# Patient Record
Sex: FEMALE | Race: WHITE | HISPANIC OR LATINO | URBAN - METROPOLITAN AREA
[De-identification: names, ages, dates, MRNs, and addresses within clinical notes are randomized per-mention and may not be internally consistent; named-entity substitution may affect disease eponyms.]

---

## 2022-07-09 ENCOUNTER — TELEPHONE ENCOUNTER (OUTPATIENT)
Dept: URBAN - METROPOLITAN AREA CLINIC 121 | Facility: CLINIC | Age: 82
End: 2022-07-09

## 2022-07-10 ENCOUNTER — TELEPHONE ENCOUNTER (OUTPATIENT)
Dept: URBAN - METROPOLITAN AREA CLINIC 121 | Facility: CLINIC | Age: 82
End: 2022-07-10

## 2022-07-13 ENCOUNTER — OFFICE VISIT (OUTPATIENT)
Dept: URBAN - METROPOLITAN AREA CLINIC 63 | Facility: CLINIC | Age: 82
End: 2022-07-13

## 2022-08-12 ENCOUNTER — WEB ENCOUNTER (OUTPATIENT)
Dept: URBAN - METROPOLITAN AREA CLINIC 60 | Facility: CLINIC | Age: 82
End: 2022-08-12

## 2022-08-12 ENCOUNTER — OFFICE VISIT (OUTPATIENT)
Dept: URBAN - METROPOLITAN AREA CLINIC 60 | Facility: CLINIC | Age: 82
End: 2022-08-12
Payer: COMMERCIAL

## 2022-08-12 VITALS
SYSTOLIC BLOOD PRESSURE: 120 MMHG | WEIGHT: 150 LBS | BODY MASS INDEX: 24.99 KG/M2 | RESPIRATION RATE: 20 BRPM | DIASTOLIC BLOOD PRESSURE: 80 MMHG | OXYGEN SATURATION: 95 % | HEIGHT: 65 IN | HEART RATE: 81 BPM | TEMPERATURE: 97.7 F

## 2022-08-12 DIAGNOSIS — R19.7 DIARRHEA, UNSPECIFIED TYPE: ICD-10-CM

## 2022-08-12 DIAGNOSIS — R10.32 LEFT LOWER QUADRANT ABDOMINAL PAIN: ICD-10-CM

## 2022-08-12 PROCEDURE — 99204 OFFICE O/P NEW MOD 45 MIN: CPT | Performed by: NURSE PRACTITIONER

## 2022-08-12 RX ORDER — DULOXETINE HYDROCHLORIDE 30 MG/1
CAPSULE, DELAYED RELEASE ORAL
Qty: 90 EACH | Refills: 1 | Status: ACTIVE | COMMUNITY

## 2022-08-12 RX ORDER — CLOBETASOL PROPIONATE 0.5 MG/G
CREAM TOPICAL
Qty: 45 GRAM | Status: ACTIVE | COMMUNITY

## 2022-08-12 RX ORDER — PRAZOSIN HYDROCHLORIDE 1 MG/1
CAPSULE ORAL
Qty: 180 EACH | Refills: 0 | Status: ACTIVE | COMMUNITY

## 2022-08-12 RX ORDER — HYDROXYZINE PAMOATE 25 MG/1
CAPSULE ORAL
Qty: 90 EACH | Refills: 0 | Status: ACTIVE | COMMUNITY

## 2022-08-12 RX ORDER — ALPRAZOLAM 0.5 MG/1
1 TABLET TABLET ORAL TWICE A DAY
Status: ACTIVE | COMMUNITY

## 2022-08-12 RX ORDER — LOSARTAN POTASSIUM 25 MG/1
1 TABLET TABLET ORAL ONCE A DAY
Status: ACTIVE | COMMUNITY

## 2022-08-12 RX ORDER — LEVOTHYROXINE SODIUM 25 UG/1
TABLET ORAL
Qty: 90 TABLET | Status: ACTIVE | COMMUNITY

## 2022-08-12 NOTE — HPI-TODAY'S VISIT:
8/22 Patient here today referred by her primary doctor due to diarrhea.  She states diarrhea  were 2 months ago.  The diarrhea lasted for several days, not accompanied by any GI symptoms, denies any rectal bleeding, mucus discharge, fever, or any other GI symptoms. Also complains of left lower quadrant abdominal pain.  The pain is chronic is on and off, not associated with any other GI symptoms.  Medical history of colon polyps.  Last colonoscopy was done more than 10 years ago in [place]. Denies any personal or family history of colorectal cancer.  Patient will have stool studies colonoscopy.

## 2022-08-12 NOTE — PHYSICAL EXAM GASTROINTESTINAL
Abdomen: Soft, left lower abdomen tenderness, nondistended , no guarding or rigidity , no masses palpable , normal bowel sounds , Liver and Spleen , no hepatomegaly present , no hepatosplenomegaly , liver nontender , spleen not palpable

## 2022-08-17 LAB — GIARDIA LAMBLIA AG, EIA: NEGATIVE

## 2022-08-19 ENCOUNTER — LAB OUTSIDE AN ENCOUNTER (OUTPATIENT)
Dept: URBAN - METROPOLITAN AREA CLINIC 60 | Facility: CLINIC | Age: 82
End: 2022-08-19

## 2022-08-30 LAB
C DIFFICILE TOXINS A+B, EIA: NEGATIVE
C DIFFICILE, CYTOTOXIN B: (no result)
CAMPYLOBACTER CULTURE: (no result)
E COLI SHIGA TOXIN EIA: NEGATIVE
Lab: (no result)
OVA + PARASITE EXAM: (no result)
PANCREATIC ELASTASE, FECAL: 50
SALMONELLA/SHIGELLA SCREEN: (no result)

## 2022-09-16 ENCOUNTER — CLAIMS CREATED FROM THE CLAIM WINDOW (OUTPATIENT)
Dept: URBAN - METROPOLITAN AREA SURGERY CENTER 4 | Facility: SURGERY CENTER | Age: 82
End: 2022-09-16
Payer: COMMERCIAL

## 2022-09-16 DIAGNOSIS — K57.30 DIVERTCULOSIS OF LG INT W/O PERFORATION OR ABSCESS W/O BLEEDING: ICD-10-CM

## 2022-09-16 DIAGNOSIS — K64.0 GRADE I HEMORRHOIDS: ICD-10-CM

## 2022-09-16 DIAGNOSIS — Z12.11 COLON CANCER SCREENING: ICD-10-CM

## 2022-09-16 DIAGNOSIS — D12.2 ADENOMATOUS POLYP OF ASCENDING COLON: ICD-10-CM

## 2022-09-16 PROCEDURE — G8918 PT W/O PREOP ORDER IV AB PRO: HCPCS | Performed by: INTERNAL MEDICINE

## 2022-09-16 PROCEDURE — 45380 COLONOSCOPY AND BIOPSY: CPT | Performed by: INTERNAL MEDICINE

## 2022-09-16 PROCEDURE — G8907 PT DOC NO EVENTS ON DISCHARG: HCPCS | Performed by: INTERNAL MEDICINE

## 2022-09-16 RX ORDER — HYDROXYZINE PAMOATE 25 MG/1
CAPSULE ORAL
Qty: 90 EACH | Refills: 0 | Status: ACTIVE | COMMUNITY

## 2022-09-16 RX ORDER — DULOXETINE HYDROCHLORIDE 30 MG/1
CAPSULE, DELAYED RELEASE ORAL
Qty: 90 EACH | Refills: 1 | Status: ACTIVE | COMMUNITY

## 2022-09-16 RX ORDER — ALPRAZOLAM 0.5 MG/1
1 TABLET TABLET ORAL TWICE A DAY
Status: ACTIVE | COMMUNITY

## 2022-09-16 RX ORDER — PRAZOSIN HYDROCHLORIDE 1 MG/1
CAPSULE ORAL
Qty: 180 EACH | Refills: 0 | Status: ACTIVE | COMMUNITY

## 2022-09-16 RX ORDER — CLOBETASOL PROPIONATE 0.5 MG/G
CREAM TOPICAL
Qty: 45 GRAM | Status: ACTIVE | COMMUNITY

## 2022-09-16 RX ORDER — LOSARTAN POTASSIUM 25 MG/1
1 TABLET TABLET ORAL ONCE A DAY
Status: ACTIVE | COMMUNITY

## 2022-09-16 RX ORDER — LEVOTHYROXINE SODIUM 25 UG/1
TABLET ORAL
Qty: 90 TABLET | Status: ACTIVE | COMMUNITY

## 2022-09-22 PROBLEM — 398050005 DIVERTICULAR DISEASE OF COLON: Status: ACTIVE | Noted: 2022-09-22

## 2022-09-29 ENCOUNTER — OFFICE VISIT (OUTPATIENT)
Dept: URBAN - METROPOLITAN AREA CLINIC 60 | Facility: CLINIC | Age: 82
End: 2022-09-29

## 2022-10-27 ENCOUNTER — OFFICE VISIT (OUTPATIENT)
Dept: URBAN - METROPOLITAN AREA CLINIC 60 | Facility: CLINIC | Age: 82
End: 2022-10-27
Payer: COMMERCIAL

## 2022-10-27 ENCOUNTER — DASHBOARD ENCOUNTERS (OUTPATIENT)
Age: 82
End: 2022-10-27

## 2022-10-27 VITALS
HEART RATE: 76 BPM | DIASTOLIC BLOOD PRESSURE: 78 MMHG | SYSTOLIC BLOOD PRESSURE: 120 MMHG | WEIGHT: 149 LBS | TEMPERATURE: 97.9 F | HEIGHT: 65 IN | BODY MASS INDEX: 24.83 KG/M2 | OXYGEN SATURATION: 91 % | RESPIRATION RATE: 20 BRPM

## 2022-10-27 DIAGNOSIS — K57.30 DIVERTICULA OF COLON: ICD-10-CM

## 2022-10-27 DIAGNOSIS — D12.6 ADENOMATOUS POLYP OF COLON, UNSPECIFIED PART OF COLON: ICD-10-CM

## 2022-10-27 DIAGNOSIS — K64.1 GRADE II HEMORRHOIDS: ICD-10-CM

## 2022-10-27 PROCEDURE — 99213 OFFICE O/P EST LOW 20 MIN: CPT | Performed by: NURSE PRACTITIONER

## 2022-10-27 RX ORDER — ALPRAZOLAM 0.5 MG/1
1 TABLET TABLET ORAL TWICE A DAY
Status: ACTIVE | COMMUNITY

## 2022-10-27 RX ORDER — DULOXETINE HYDROCHLORIDE 30 MG/1
CAPSULE, DELAYED RELEASE ORAL
Qty: 90 EACH | Refills: 1 | Status: ACTIVE | COMMUNITY

## 2022-10-27 RX ORDER — LOSARTAN POTASSIUM 25 MG/1
1 TABLET TABLET ORAL ONCE A DAY
Status: ACTIVE | COMMUNITY

## 2022-10-27 RX ORDER — PRAZOSIN HYDROCHLORIDE 1 MG/1
CAPSULE ORAL
Qty: 180 EACH | Refills: 0 | Status: ACTIVE | COMMUNITY

## 2022-10-27 RX ORDER — LEVOTHYROXINE SODIUM 25 UG/1
TABLET ORAL
Qty: 90 TABLET | Status: ACTIVE | COMMUNITY

## 2022-10-27 RX ORDER — HYDROXYZINE PAMOATE 25 MG/1
CAPSULE ORAL
Qty: 90 EACH | Refills: 0 | Status: ACTIVE | COMMUNITY

## 2022-10-27 RX ORDER — CLOBETASOL PROPIONATE 0.5 MG/G
CREAM TOPICAL
Qty: 45 GRAM | Status: ACTIVE | COMMUNITY

## 2022-10-27 NOTE — HPI-TODAY'S VISIT:
8/22 Patient here today referred by her primary doctor due to diarrhea.  She states diarrhea  were 2 months ago.  The diarrhea lasted for several days, not accompanied by any GI symptoms, denies any rectal bleeding, mucus discharge, fever, or any other GI symptoms. Also complains of left lower quadrant abdominal pain.  The pain is chronic is on and off, not associated with any other GI symptoms.  Medical history of colon polyps.  Last colonoscopy was done more than 10 years ago in [place]. Denies any personal or family history of colorectal cancer.  Patient will have stool studies colonoscopy. 10/22 Patient colonoscopy shows evidence of two 6 mm polyp into the ascending colon, mild diverticulosis of the sigmoid and ascending colon, internal hemorrhoids, the portion of the ileum was normal biopsy, the entire examined colon is normal and was biopsied as well. Biopsy results shows evidence of terminal ileum biopsy and ileal mucosa biopsy  are without significant distortion or inflammation, ascending colon positive for tubular adenoma polyp, colon biopsy without significant distortion or inflammation, no evidence of microscopic colitis. Colonoscopy in 5 year.

## 2022-10-29 PROBLEM — 733657002: Status: ACTIVE | Noted: 2022-10-29
